# Patient Record
Sex: FEMALE | URBAN - METROPOLITAN AREA
[De-identification: names, ages, dates, MRNs, and addresses within clinical notes are randomized per-mention and may not be internally consistent; named-entity substitution may affect disease eponyms.]

---

## 2022-11-16 ENCOUNTER — APPOINTMENT (OUTPATIENT)
Age: 87
Setting detail: DERMATOLOGY
End: 2022-11-17

## 2022-11-16 DIAGNOSIS — L0391 CELLULITIS AND ABSCESS OF UNSPECIFIED SITES: ICD-10-CM

## 2022-11-16 DIAGNOSIS — L0390 CELLULITIS AND ABSCESS OF UNSPECIFIED SITES: ICD-10-CM

## 2022-11-16 PROBLEM — L03.313 CELLULITIS OF CHEST WALL: Status: ACTIVE | Noted: 2022-11-16

## 2022-11-16 PROCEDURE — OTHER TREATMENT REGIMEN: OTHER

## 2022-11-16 PROCEDURE — 99203 OFFICE O/P NEW LOW 30 MIN: CPT

## 2022-11-16 PROCEDURE — OTHER FOLLOW UP FOR NEXT VISIT: OTHER

## 2022-11-16 ASSESSMENT — LOCATION ZONE DERM: LOCATION ZONE: TRUNK

## 2022-11-16 ASSESSMENT — SEVERITY ASSESSMENT: SEVERITY: MODERATE TO SEVERE

## 2022-11-16 ASSESSMENT — LOCATION SIMPLE DESCRIPTION DERM: LOCATION SIMPLE: CHEST

## 2022-11-16 ASSESSMENT — PAIN INTENSITY VAS: HOW INTENSE IS YOUR PAIN 0 BEING NO PAIN, 10 BEING THE MOST SEVERE PAIN POSSIBLE?: 3/10 PAIN

## 2022-11-16 ASSESSMENT — LOCATION DETAILED DESCRIPTION DERM: LOCATION DETAILED: MIDDLE STERNUM

## 2022-11-16 NOTE — HPI: SKIN LESION
What Type Of Note Output Would You Prefer (Optional)?: Bullet Format
How Severe Is Your Skin Lesion?: moderate
Has Your Skin Lesion Been Treated?: not been treated
Is This A New Presentation, Or A Follow-Up?: Skin Lesions
Additional History: Patient presents for evaluation of skin lesions on her upper chest.  Her daughter states it has been there for about a year and has progressively been getting worse.  At the time of her visit we didn’t have much information from the nursing home. However since calling the wound care nurse on Thursday it sounds like the patient had some large cutaneous horn‘s in January they fell off and created a nonhealing wound. She’s been seeing wound care about the skin continue to get worse. She was on Clinda myosin but recently had a culture come back showing sensitivity to Vancomyosin so in talking to the wound care nurse she has recently been put on Vancomyosin

## 2022-11-16 NOTE — PROCEDURE: TREATMENT REGIMEN
Plan: It was a little confusing as to why the patient was referred to us. The reason for the referral was cutaneous horn‘s but I don’t think that is the issue. This appears to be a fairly significant subcutaneous infection. The patient is on Clinda myosin so I’m going to have them stop the clindamycin and start doxycycline. They will also start silver sulfadiazine to apply once daily. If they can call me with clinical updates as well as email me photos that would be helpful. We are going to try to contact the care facility to get more clinical information from them. I did speak to Laura the wound care nurse [phone] 11/17. In light of the new culture they are putting her on vancomycin. They are not going to start the doxycycline and they will go back to using the silver sulfadiazine. Hopefully this will lead to resolution. I told Laura she can reach out to me directly if further assessment is needed
Discontinue Regimen: Clindamycin
Detail Level: Simple
Initiate Treatment: Doxycycline, Silver sulfadiazine